# Patient Record
Sex: MALE | URBAN - METROPOLITAN AREA
[De-identification: names, ages, dates, MRNs, and addresses within clinical notes are randomized per-mention and may not be internally consistent; named-entity substitution may affect disease eponyms.]

---

## 2023-07-31 ENCOUNTER — CLAIMS CREATED FROM THE CLAIM WINDOW (OUTPATIENT)
Dept: URBAN - METROPOLITAN AREA MEDICAL CENTER 17 | Facility: MEDICAL CENTER | Age: 28
End: 2023-07-31
Payer: COMMERCIAL

## 2023-07-31 DIAGNOSIS — K31.1 ACQUIRED GASTRIC OUTLET STENOSIS: ICD-10-CM

## 2023-07-31 DIAGNOSIS — K56.41 FECAL IMPACTION: ICD-10-CM

## 2023-07-31 PROCEDURE — 99222 1ST HOSP IP/OBS MODERATE 55: CPT | Performed by: INTERNAL MEDICINE

## 2023-07-31 PROCEDURE — 99254 IP/OBS CNSLTJ NEW/EST MOD 60: CPT | Performed by: INTERNAL MEDICINE

## 2023-07-31 PROCEDURE — G8427 DOCREV CUR MEDS BY ELIG CLIN: HCPCS | Performed by: INTERNAL MEDICINE

## 2023-08-01 ENCOUNTER — CLAIMS CREATED FROM THE CLAIM WINDOW (OUTPATIENT)
Dept: URBAN - METROPOLITAN AREA MEDICAL CENTER 17 | Facility: MEDICAL CENTER | Age: 28
End: 2023-08-01
Payer: COMMERCIAL

## 2023-08-01 DIAGNOSIS — K31.1 ACQUIRED GASTRIC OUTLET STENOSIS: ICD-10-CM

## 2023-08-01 PROCEDURE — 99232 SBSQ HOSP IP/OBS MODERATE 35: CPT | Performed by: INTERNAL MEDICINE

## 2023-08-02 ENCOUNTER — CLAIMS CREATED FROM THE CLAIM WINDOW (OUTPATIENT)
Dept: URBAN - METROPOLITAN AREA MEDICAL CENTER 17 | Facility: MEDICAL CENTER | Age: 28
End: 2023-08-02
Payer: COMMERCIAL

## 2023-08-02 DIAGNOSIS — K31.1 ACQUIRED GASTRIC OUTLET STENOSIS: ICD-10-CM

## 2023-08-02 PROCEDURE — 99232 SBSQ HOSP IP/OBS MODERATE 35: CPT | Performed by: INTERNAL MEDICINE

## 2023-08-03 ENCOUNTER — CLAIMS CREATED FROM THE CLAIM WINDOW (OUTPATIENT)
Dept: URBAN - METROPOLITAN AREA MEDICAL CENTER 17 | Facility: MEDICAL CENTER | Age: 28
End: 2023-08-03
Payer: COMMERCIAL

## 2023-08-03 DIAGNOSIS — K31.1 ACQUIRED GASTRIC OUTLET STENOSIS: ICD-10-CM

## 2023-08-03 PROCEDURE — 99232 SBSQ HOSP IP/OBS MODERATE 35: CPT | Performed by: INTERNAL MEDICINE

## 2024-07-23 ENCOUNTER — DASHBOARD ENCOUNTERS (OUTPATIENT)
Age: 29
End: 2024-07-23

## 2024-07-24 ENCOUNTER — OFFICE VISIT (OUTPATIENT)
Dept: URBAN - METROPOLITAN AREA CLINIC 48 | Facility: CLINIC | Age: 29
End: 2024-07-24
Payer: COMMERCIAL

## 2024-07-24 VITALS — BODY MASS INDEX: 18.33 KG/M2 | TEMPERATURE: 97.6 F | HEIGHT: 65 IN | WEIGHT: 110 LBS

## 2024-07-24 DIAGNOSIS — R79.89 ELEVATED LFTS: ICD-10-CM

## 2024-07-24 DIAGNOSIS — K59.01 SLOW TRANSIT CONSTIPATION: ICD-10-CM

## 2024-07-24 DIAGNOSIS — D50.0 IRON DEFICIENCY ANEMIA DUE TO CHRONIC BLOOD LOSS: ICD-10-CM

## 2024-07-24 DIAGNOSIS — K76.0 HEPATIC STEATOSIS: ICD-10-CM

## 2024-07-24 PROBLEM — 197321007: Status: ACTIVE | Noted: 2024-07-24

## 2024-07-24 PROBLEM — 35298007: Status: ACTIVE | Noted: 2024-07-24

## 2024-07-24 PROBLEM — 724556004: Status: ACTIVE | Noted: 2024-07-24

## 2024-07-24 PROBLEM — 863927004: Status: ACTIVE | Noted: 2024-07-24

## 2024-07-24 PROCEDURE — 99203 OFFICE O/P NEW LOW 30 MIN: CPT | Performed by: NURSE PRACTITIONER

## 2024-07-24 RX ORDER — LEVETIRACETAM 1000 MG/1
1 TABLET TABLET, FILM COATED ORAL
Qty: 60 TABLET | Refills: 1 | Status: ACTIVE | COMMUNITY

## 2024-07-24 RX ORDER — FLUTICASONE PROPIONATE 50 UG/1
1 SPRAY IN EACH NOSTRIL SPRAY, METERED NASAL ONCE A DAY
Refills: 1 | Status: ACTIVE | COMMUNITY

## 2024-07-24 RX ORDER — MULTIVIT,IRON,MINERALS/LUTEIN
1 TABLET TABLET ORAL ONCE A DAY
Qty: 30 TABLET | Refills: 0 | Status: ACTIVE | COMMUNITY

## 2024-07-24 RX ORDER — ZINC SULFATE 50(220)MG
1 CAPSULE CAPSULE ORAL ONCE A DAY
Qty: 30 CAPSULE | Refills: 5 | Status: ACTIVE | COMMUNITY

## 2024-07-24 RX ORDER — CLONAZEPAM 1 MG/1
1 TABLET TABLET ORAL ONCE A DAY
Qty: 30 TABLET | Refills: 2 | Status: ACTIVE | COMMUNITY

## 2024-07-24 RX ORDER — GLYCOPYRROLATE 1 MG/1
1 TABLET TABLET ORAL ONCE A DAY
Qty: 30 TABLET | Refills: 0 | Status: ACTIVE | COMMUNITY

## 2024-07-24 RX ORDER — DOXEPIN HYDROCHLORIDE 10 MG/1
1 CAPSULE AT BEDTIME CAPSULE ORAL ONCE A DAY
Qty: 30 CAPSULE | Refills: 6 | Status: ACTIVE | COMMUNITY

## 2024-07-24 RX ORDER — ZINC ACETATE 25 MG/1
1 CAPSULE 1 HOUR BEFORE OR 2 HOURS AFTER A MEAL CAPSULE ORAL THREE TIMES A DAY
Refills: 0 | Status: ACTIVE | COMMUNITY

## 2024-07-24 NOTE — HPI-TODAY'S VISIT:
29 year old male with a history of autism and seizures presents with his mother/caregiver for evaluation of elevated LFTs. Labs 3/2024 show AST 66, ALT 62, alkaline phosphatase 121, and tbili <0.2.  His mother states at that time, he was unwell.US reveals hepatic steatosis. 2/2024 CT showed normal liver, large stool burden, and probable pneumonia.  She does not give him much fried food. Denies recent medication changes. He is non-verbal, has contractures, and is wheel chair bound. 3/2024 Hgb 10.9, MCV 92. His mother states he has a bowel movement most days using a stool softener about once a week. Denies overt bleeding. His mother has a history of anemia but she does not know of GI disease in the family.

## 2025-01-01 ENCOUNTER — CLAIMS CREATED FROM THE CLAIM WINDOW (OUTPATIENT)
Dept: URBAN - METROPOLITAN AREA MEDICAL CENTER 11 | Facility: MEDICAL CENTER | Age: 30
End: 2025-01-01

## 2025-01-01 ENCOUNTER — OFFICE VISIT (OUTPATIENT)
Dept: URBAN - METROPOLITAN AREA TELEHEALTH 2 | Facility: TELEHEALTH | Age: 30
End: 2025-01-01

## 2025-01-01 DIAGNOSIS — R50.81 FEVER PRESENTING WITH CONDITIONS CLASSIFIED ELSEWHERE: ICD-10-CM

## 2025-01-01 DIAGNOSIS — Z43.1 ENCOUNTER FOR ATTENTION TO GASTROSTOMY: ICD-10-CM

## 2025-01-01 DIAGNOSIS — D64.9 ANEMIA, UNSPECIFIED: ICD-10-CM

## 2025-01-01 DIAGNOSIS — R13.12 DYSPHAGIA, OROPHARYNGEAL PHASE: ICD-10-CM

## 2025-01-01 RX ORDER — LEVETIRACETAM 1000 MG/1
1 TABLET TABLET, FILM COATED ORAL
Qty: 60 TABLET | Refills: 1 | Status: ACTIVE | COMMUNITY

## 2025-01-01 RX ORDER — CHOLECALCIFEROL (VITAMIN D3) 125 MCG
1 TABLET TABLET ORAL ONCE A DAY
Status: ACTIVE | COMMUNITY

## 2025-01-01 RX ORDER — FLUTICASONE PROPIONATE 50 UG/1
1 SPRAY IN EACH NOSTRIL SPRAY, METERED NASAL ONCE A DAY
Refills: 1 | Status: ACTIVE | COMMUNITY

## 2025-01-01 RX ORDER — ZINC ACETATE 25 MG/1
1 CAPSULE 1 HOUR BEFORE OR 2 HOURS AFTER A MEAL CAPSULE ORAL THREE TIMES A DAY
Refills: 0 | Status: ACTIVE | COMMUNITY

## 2025-01-01 RX ORDER — MULTIVIT,IRON,MINERALS/LUTEIN
1 TABLET TABLET ORAL ONCE A DAY
Qty: 30 TABLET | Refills: 0 | Status: ACTIVE | COMMUNITY

## 2025-01-01 RX ORDER — GLYCOPYRROLATE 1 MG/1
1 TABLET TABLET ORAL ONCE A DAY
Qty: 30 TABLET | Refills: 0 | Status: ACTIVE | COMMUNITY

## 2025-01-01 RX ORDER — ZINC SULFATE 50(220)MG
1 CAPSULE CAPSULE ORAL ONCE A DAY
Qty: 30 CAPSULE | Refills: 5 | Status: ACTIVE | COMMUNITY

## 2025-01-01 RX ORDER — CLONAZEPAM 1 MG/1
1 TABLET TABLET ORAL ONCE A DAY
Qty: 30 TABLET | Refills: 2 | Status: ACTIVE | COMMUNITY

## 2025-01-01 RX ORDER — DOXEPIN HYDROCHLORIDE 10 MG/1
1 CAPSULE AT BEDTIME CAPSULE ORAL ONCE A DAY
Qty: 30 CAPSULE | Refills: 6 | COMMUNITY

## 2025-01-15 ENCOUNTER — OFFICE VISIT (OUTPATIENT)
Dept: URBAN - METROPOLITAN AREA TELEHEALTH 2 | Facility: TELEHEALTH | Age: 30
End: 2025-01-15
Payer: COMMERCIAL

## 2025-01-15 DIAGNOSIS — K76.0 HEPATIC STEATOSIS: ICD-10-CM

## 2025-01-15 DIAGNOSIS — R79.89 ELEVATED LFTS: ICD-10-CM

## 2025-01-15 DIAGNOSIS — D50.0 IRON DEFICIENCY ANEMIA DUE TO CHRONIC BLOOD LOSS: ICD-10-CM

## 2025-01-15 DIAGNOSIS — K59.01 SLOW TRANSIT CONSTIPATION: ICD-10-CM

## 2025-01-15 PROCEDURE — 99213 OFFICE O/P EST LOW 20 MIN: CPT | Performed by: NURSE PRACTITIONER

## 2025-01-15 RX ORDER — FLUTICASONE PROPIONATE 50 UG/1
1 SPRAY IN EACH NOSTRIL SPRAY, METERED NASAL ONCE A DAY
Refills: 1 | Status: ACTIVE | COMMUNITY

## 2025-01-15 RX ORDER — MULTIVIT,IRON,MINERALS/LUTEIN
1 TABLET TABLET ORAL ONCE A DAY
Qty: 30 TABLET | Refills: 0 | Status: ACTIVE | COMMUNITY

## 2025-01-15 RX ORDER — DOXEPIN HYDROCHLORIDE 10 MG/1
1 CAPSULE AT BEDTIME CAPSULE ORAL ONCE A DAY
Qty: 30 CAPSULE | Refills: 6 | Status: ON HOLD | COMMUNITY

## 2025-01-15 RX ORDER — ZINC SULFATE 50(220)MG
1 CAPSULE CAPSULE ORAL ONCE A DAY
Qty: 30 CAPSULE | Refills: 5 | Status: ACTIVE | COMMUNITY

## 2025-01-15 RX ORDER — LEVETIRACETAM 1000 MG/1
1 TABLET TABLET, FILM COATED ORAL
Qty: 60 TABLET | Refills: 1 | Status: ACTIVE | COMMUNITY

## 2025-01-15 RX ORDER — CLONAZEPAM 1 MG/1
1 TABLET TABLET ORAL ONCE A DAY
Qty: 30 TABLET | Refills: 2 | Status: ACTIVE | COMMUNITY

## 2025-01-15 RX ORDER — ZINC ACETATE 25 MG/1
1 CAPSULE 1 HOUR BEFORE OR 2 HOURS AFTER A MEAL CAPSULE ORAL THREE TIMES A DAY
Refills: 0 | Status: ACTIVE | COMMUNITY

## 2025-01-15 RX ORDER — CHOLECALCIFEROL (VITAMIN D3) 125 MCG
1 TABLET TABLET ORAL ONCE A DAY
Status: ACTIVE | COMMUNITY

## 2025-01-15 RX ORDER — GLYCOPYRROLATE 1 MG/1
1 TABLET TABLET ORAL ONCE A DAY
Qty: 30 TABLET | Refills: 0 | Status: ACTIVE | COMMUNITY

## 2025-01-15 NOTE — HPI-TODAY'S VISIT:
29 year old male with a history of autism and seizures presents for follow up per phone call with his mother. He is non-verbal, has contractures, and is wheel chair bound.  Labs 3/2024 show AST 66, ALT 62, alkaline phosphatase 121, and tbili <0.2. Hgb 10.9, MCV 92. His mother states at that time, he was unwell. US reveals hepatic steatosis. 2/2024 CT showed normal liver, large stool burden, and probable pneumonia. Currently, he is in the hospital for pneumonia and hyponatremia. Bowel movements are loose and watery since being in the hospital. Prior to being in the hospital, he was taking stool softener prn. He has had a PEG placed due to aspiration but he gets a few ice chips. His mother still cares for him at home and gives his tube feedings.

## 2025-02-13 ENCOUNTER — CLAIMS CREATED FROM THE CLAIM WINDOW (OUTPATIENT)
Age: 30
End: 2025-02-13

## 2025-02-13 PROCEDURE — 99232 SBSQ HOSP IP/OBS MODERATE 35: CPT | Performed by: INTERNAL MEDICINE

## 2025-04-16 NOTE — HPI-TODAY'S VISIT:
69-year-old male presents for follow-up.  Last OV 1/15/2025.  Patient has history of autism and seizures.  He is nonverbal, has contractures, and is wheelchair-bound.  Patient's mother assists with history taking.  Labs 3/2024 showed AST 66, ALT 62, alk phos 121,  T. bili less than 0.2.  Hemoglobin was 10.9, with MCV 92.  Ultrasound revealed hepatic steatosis.  CT 2/2024 showed normal liver, large stool burden, probable pneumonia.  At last visit, patient was in the hospital for pneumonia and hyponatremia.  Described loose/watery BMs since being in hospital.  Prior to being in hospital, patient was taking stool softener as needed for constipation.  He has PEG placed due to aspiration but gets a few ice chips.  Recommended repeat CBC when recovered from pneumonia, then consider Cologuard.  Reassess LFTs after recovery from pneumonia.  Recommended Mediterranean diet for hepatic steatosis.  Looser stool since PEG feeding started, and patient was also on antibiotics for pneumonia.  Recommended constipation management as needed. Of note, 2/13/2025, patient was in the hospital after being admitted for anemia, with drop in hemoglobin 8.1-6.6.  Patient received 4 units PRBCs.  Hemoccult was positive.  Recommended holding blood thinners, monitoring stools for blood, holding off on EGD/colon.

## 2025-04-28 ENCOUNTER — OFFICE VISIT (OUTPATIENT)
Dept: URBAN - METROPOLITAN AREA TELEHEALTH 2 | Facility: TELEHEALTH | Age: 30
End: 2025-04-28